# Patient Record
Sex: FEMALE | Race: WHITE
[De-identification: names, ages, dates, MRNs, and addresses within clinical notes are randomized per-mention and may not be internally consistent; named-entity substitution may affect disease eponyms.]

---

## 2018-05-19 ENCOUNTER — HOSPITAL ENCOUNTER (INPATIENT)
Dept: HOSPITAL 54 - ER | Age: 73
LOS: 3 days | Discharge: HOME | DRG: 205 | End: 2018-05-22
Attending: NURSE PRACTITIONER | Admitting: NURSE PRACTITIONER
Payer: MEDICARE

## 2018-05-19 VITALS — WEIGHT: 195 LBS | BODY MASS INDEX: 30.61 KG/M2 | HEIGHT: 67 IN

## 2018-05-19 VITALS — DIASTOLIC BLOOD PRESSURE: 73 MMHG | SYSTOLIC BLOOD PRESSURE: 123 MMHG

## 2018-05-19 DIAGNOSIS — K44.9: ICD-10-CM

## 2018-05-19 DIAGNOSIS — I44.7: ICD-10-CM

## 2018-05-19 DIAGNOSIS — G43.909: ICD-10-CM

## 2018-05-19 DIAGNOSIS — I25.10: ICD-10-CM

## 2018-05-19 DIAGNOSIS — M94.0: Primary | ICD-10-CM

## 2018-05-19 DIAGNOSIS — Z91.013: ICD-10-CM

## 2018-05-19 DIAGNOSIS — D50.9: ICD-10-CM

## 2018-05-19 DIAGNOSIS — M19.90: ICD-10-CM

## 2018-05-19 DIAGNOSIS — K62.1: ICD-10-CM

## 2018-05-19 DIAGNOSIS — G89.29: ICD-10-CM

## 2018-05-19 DIAGNOSIS — E46: ICD-10-CM

## 2018-05-19 DIAGNOSIS — K57.30: ICD-10-CM

## 2018-05-19 DIAGNOSIS — N17.0: ICD-10-CM

## 2018-05-19 DIAGNOSIS — K25.9: ICD-10-CM

## 2018-05-19 DIAGNOSIS — K64.8: ICD-10-CM

## 2018-05-19 DIAGNOSIS — M79.2: ICD-10-CM

## 2018-05-19 DIAGNOSIS — I25.2: ICD-10-CM

## 2018-05-19 DIAGNOSIS — M40.209: ICD-10-CM

## 2018-05-19 DIAGNOSIS — Z79.899: ICD-10-CM

## 2018-05-19 LAB
ALBUMIN SERPL BCP-MCNC: 3.5 G/DL (ref 3.4–5)
ALP SERPL-CCNC: 81 U/L (ref 46–116)
ALT SERPL W P-5'-P-CCNC: 20 U/L (ref 12–78)
APTT PPP: 22 SEC (ref 23–34)
AST SERPL W P-5'-P-CCNC: 16 U/L (ref 15–37)
BASOPHILS # BLD AUTO: 0 /CMM (ref 0–0.2)
BASOPHILS NFR BLD AUTO: 0.4 % (ref 0–2)
BILIRUB DIRECT SERPL-MCNC: 0.1 MG/DL (ref 0–0.2)
BILIRUB SERPL-MCNC: 0.4 MG/DL (ref 0.2–1)
BUN SERPL-MCNC: 26 MG/DL (ref 7–18)
CALCIUM SERPL-MCNC: 8.8 MG/DL (ref 8.5–10.1)
CHLORIDE SERPL-SCNC: 103 MMOL/L (ref 98–107)
CO2 SERPL-SCNC: 25 MMOL/L (ref 21–32)
CREAT SERPL-MCNC: 1.2 MG/DL (ref 0.6–1.3)
EOSINOPHIL NFR BLD AUTO: 0.6 % (ref 0–6)
GLUCOSE SERPL-MCNC: 113 MG/DL (ref 74–106)
HCT VFR BLD AUTO: 26 % (ref 33–45)
HGB BLD-MCNC: 8.1 G/DL (ref 11.5–14.8)
INR PPP: 0.95 (ref 0.87–1.13)
LYMPHOCYTES NFR BLD AUTO: 1.3 /CMM (ref 0.8–4.8)
LYMPHOCYTES NFR BLD AUTO: 13.5 % (ref 20–44)
MCHC RBC AUTO-ENTMCNC: 32 G/DL (ref 31–36)
MCV RBC AUTO: 60 FL (ref 82–100)
MONOCYTES NFR BLD AUTO: 0.7 /CMM (ref 0.1–1.3)
MONOCYTES NFR BLD AUTO: 7.1 % (ref 2–12)
NEUTROPHILS # BLD AUTO: 7.6 /CMM (ref 1.8–8.9)
NEUTROPHILS NFR BLD AUTO: 78.4 % (ref 43–81)
NT-PROBNP SERPL-MCNC: 226 PG/ML (ref 0–125)
PLATELET # BLD AUTO: 419 /CMM (ref 150–450)
POTASSIUM SERPL-SCNC: 4.3 MMOL/L (ref 3.5–5.1)
PROT SERPL-MCNC: 7.7 G/DL (ref 6.4–8.2)
RBC # BLD AUTO: 4.3 MIL/UL (ref 4–5.2)
RDW COEFFICIENT OF VARIATION: 18.4 (ref 11.5–15)
SODIUM SERPL-SCNC: 140 MMOL/L (ref 136–145)
TROPONIN I SERPL-MCNC: < 0.017 NG/ML (ref 0–0.06)
WBC NRBC COR # BLD AUTO: 9.7 K/UL (ref 4.3–11)

## 2018-05-19 PROCEDURE — Z7610: HCPCS

## 2018-05-19 PROCEDURE — A4606 OXYGEN PROBE USED W OXIMETER: HCPCS

## 2018-05-19 PROCEDURE — C9113 INJ PANTOPRAZOLE SODIUM, VIA: HCPCS

## 2018-05-19 NOTE — NUR
SENT FROM URGENT CARE FOR ABNORMAL EKG. WEAKNESS X3 DAYS. VITAL SIGNS WNL 
A/OX4. WILL CONTINUE TO MONITOR FOR ANY CHANGES.

## 2018-05-19 NOTE — NUR
TELE RN ADMISSION NOTES

RECEIVED FROM ER VIA ANTONIO UNDER THE SERVICE OF KULDIP WARD DNP.ALERT,ORIENTED X4,NO 
SOB.CLAIMED WEAKNESS X 3DAYS AND ABNORMAL EKG.SR WITH BBB-81 ON TELE MONITOR.SALINE LOCK 
LEFT AC INTACT AND PATENT.AMBULATE WITH STAND BY ASSIST,WITH STEADY GAIT.C/O PAIN 2/10 ON 
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 
USUALLY CALLED BY  HER CLIENTS "DR FAULKNER '.DONT WANT TO BE CALLED ARUN.CALL LIGHT IN 
REACH,NEEDS ANTICIPATED.

## 2018-05-20 VITALS — DIASTOLIC BLOOD PRESSURE: 64 MMHG | SYSTOLIC BLOOD PRESSURE: 135 MMHG

## 2018-05-20 VITALS — SYSTOLIC BLOOD PRESSURE: 162 MMHG | DIASTOLIC BLOOD PRESSURE: 62 MMHG

## 2018-05-20 VITALS — SYSTOLIC BLOOD PRESSURE: 149 MMHG | DIASTOLIC BLOOD PRESSURE: 58 MMHG

## 2018-05-20 VITALS — DIASTOLIC BLOOD PRESSURE: 62 MMHG | SYSTOLIC BLOOD PRESSURE: 136 MMHG

## 2018-05-20 VITALS — DIASTOLIC BLOOD PRESSURE: 61 MMHG | SYSTOLIC BLOOD PRESSURE: 130 MMHG

## 2018-05-20 VITALS — DIASTOLIC BLOOD PRESSURE: 60 MMHG | SYSTOLIC BLOOD PRESSURE: 139 MMHG

## 2018-05-20 VITALS — DIASTOLIC BLOOD PRESSURE: 53 MMHG | SYSTOLIC BLOOD PRESSURE: 140 MMHG

## 2018-05-20 VITALS — SYSTOLIC BLOOD PRESSURE: 132 MMHG | DIASTOLIC BLOOD PRESSURE: 62 MMHG

## 2018-05-20 VITALS — SYSTOLIC BLOOD PRESSURE: 133 MMHG | DIASTOLIC BLOOD PRESSURE: 57 MMHG

## 2018-05-20 VITALS — SYSTOLIC BLOOD PRESSURE: 134 MMHG | DIASTOLIC BLOOD PRESSURE: 58 MMHG

## 2018-05-20 VITALS — DIASTOLIC BLOOD PRESSURE: 66 MMHG | SYSTOLIC BLOOD PRESSURE: 150 MMHG

## 2018-05-20 VITALS — DIASTOLIC BLOOD PRESSURE: 62 MMHG | SYSTOLIC BLOOD PRESSURE: 132 MMHG

## 2018-05-20 VITALS — DIASTOLIC BLOOD PRESSURE: 75 MMHG | SYSTOLIC BLOOD PRESSURE: 137 MMHG

## 2018-05-20 LAB
ALBUMIN SERPL BCP-MCNC: 3 G/DL (ref 3.4–5)
ALP SERPL-CCNC: 67 U/L (ref 46–116)
ALT SERPL W P-5'-P-CCNC: 17 U/L (ref 12–78)
AST SERPL W P-5'-P-CCNC: 17 U/L (ref 15–37)
BILIRUB SERPL-MCNC: 0.4 MG/DL (ref 0.2–1)
BUN SERPL-MCNC: 20 MG/DL (ref 7–18)
CALCIUM SERPL-MCNC: 8.3 MG/DL (ref 8.5–10.1)
CHLORIDE SERPL-SCNC: 107 MMOL/L (ref 98–107)
CHOLEST SERPL-MCNC: 155 MG/DL (ref ?–200)
CO2 SERPL-SCNC: 24 MMOL/L (ref 21–32)
CREAT SERPL-MCNC: 0.7 MG/DL (ref 0.6–1.3)
EOSINOPHIL NFR BLD MANUAL: 2 % (ref 0–4)
GLUCOSE SERPL-MCNC: 90 MG/DL (ref 74–106)
HCT VFR BLD AUTO: 23 % (ref 33–45)
HDLC SERPL-MCNC: 60 MG/DL (ref 40–60)
HEMOCCULT STL QL: NEGATIVE
HGB BLD-MCNC: 6.9 G/DL (ref 11.5–14.8)
IRON SERPL-MCNC: 10 UG/DL (ref 50–175)
LDLC SERPL DIRECT ASSAY-MCNC: 90 MG/DL (ref 0–99)
LYMPHOCYTES NFR BLD MANUAL: 34 % (ref 16–48)
MAGNESIUM SERPL-MCNC: 2.2 MG/DL (ref 1.8–2.4)
MCHC RBC AUTO-ENTMCNC: 30 G/DL (ref 31–36)
MCV RBC AUTO: 61 FL (ref 82–100)
MONOCYTES NFR BLD MANUAL: 8 % (ref 0–11)
NEUTS SEG NFR BLD MANUAL: 56 % (ref 42–76)
PHOSPHATE SERPL-MCNC: 3.7 MG/DL (ref 2.5–4.9)
PLATELET # BLD AUTO: 323 /CMM (ref 150–450)
POTASSIUM SERPL-SCNC: 4 MMOL/L (ref 3.5–5.1)
PROT SERPL-MCNC: 6.5 G/DL (ref 6.4–8.2)
RBC # BLD AUTO: 3.83 MIL/UL (ref 4–5.2)
RDW COEFFICIENT OF VARIATION: 19.9 (ref 11.5–15)
SODIUM SERPL-SCNC: 140 MMOL/L (ref 136–145)
TIBC SERPL-MCNC: 423 UG/DL (ref 250–450)
TRIGL SERPL-MCNC: 52 MG/DL (ref 30–150)
TSH SERPL DL<=0.005 MIU/L-ACNC: 1.33 UIU/ML (ref 0.36–3.74)
WBC NRBC COR # BLD AUTO: 6.7 K/UL (ref 4.3–11)

## 2018-05-20 PROCEDURE — 30233N1 TRANSFUSION OF NONAUTOLOGOUS RED BLOOD CELLS INTO PERIPHERAL VEIN, PERCUTANEOUS APPROACH: ICD-10-PCS | Performed by: NURSE PRACTITIONER

## 2018-05-20 RX ADMIN — ZOLPIDEM TARTRATE PRN MG: 5 TABLET, FILM COATED ORAL at 00:15

## 2018-05-20 RX ADMIN — GABAPENTIN SCH MG: 100 CAPSULE ORAL at 17:33

## 2018-05-20 RX ADMIN — SODIUM CHLORIDE SCH MG: 9 INJECTION, SOLUTION INTRAVENOUS at 13:57

## 2018-05-20 RX ADMIN — GABAPENTIN SCH MG: 100 CAPSULE ORAL at 09:54

## 2018-05-20 RX ADMIN — ZOLPIDEM TARTRATE PRN MG: 5 TABLET, FILM COATED ORAL at 22:54

## 2018-05-20 RX ADMIN — GABAPENTIN SCH MG: 100 CAPSULE ORAL at 12:51

## 2018-05-20 RX ADMIN — SODIUM CHLORIDE SCH MLS/HR: 9 INJECTION, SOLUTION INTRAVENOUS at 17:32

## 2018-05-20 NOTE — NUR
MS RN NOTES:



PT'S BOWEL MOVEMENTS ARE CLEAR, WATERY, YELLOW. NO MORE FORMED, SOFT STOOLS SHOWING. WILL 
CONTINUE TO MONITOR AS SHE GOES.

## 2018-05-20 NOTE — NUR
TELE RN NOTES

STARTED BLOOD TRANSFUSION, PATIENT WITH STABLE VITAL SIGNS. NO ACUTE DISTRESS NOTED. WILL 
CONTINUE TO MONITOR ACCORDINGLY.

## 2018-05-20 NOTE — NUR
TELE RN NOTES

PATIENT IN BED EYES CLOSED, RESPOND TO VERBAL AND TACTILE STIMULI. NO ACUTE DISTRESS NOTED. 
BREATHING UNLABORED. NO SOB NOTED. DENIED ANY PAIN . IV ACCESS PATENT AND INTACT.SAFETY 
MEASURES IN PLACE. CALL LIGHT WITHIN REACH. WILL CONTINUE TO MONITOR ACCORDINGLY.

## 2018-05-20 NOTE — NUR
MS RN NOTES:



RECEIVED PT AND IS AWAKE. PT IS A/OX4. PT HAS IV ON LAC AND IS PATENT AND INTACT. PT STARTED 
GOLYTELY AND MAG CITRATE. CALL LIGHT WITHIN PT'S REACH. BED KEPT IN LOW, LOCKED POSITION, 
AND SIDE RAILS X 2UP. INFORMED PT THAT POST MIDNIGHT SHE IS NPO FOR PROCEDURE TOMORROW. PT 
AWARE AND UNDERSTOOD. WILL CONTINUE TO MONITOR PT.

## 2018-05-20 NOTE — NUR
TELE RN NOTES  PAIN MANAGEMENT

C/O GENRALIZED PAIN 5.10 ON PAIN SCALE,ULTRAM 25MG PO GIVEN AS ORDERED.

## 2018-05-20 NOTE — NUR
TELE RN NOTES

SR WITH BBB-79,ABLE TO SLEPT WELL WITH AMBIEN AND ULTRAM.IN NO ACUTE DISTRESS.WILL ENDORSE 
TO DAY NURSE FOR LINDA.

## 2018-05-20 NOTE — NUR
MS RN NOTES:



PT REQUESTED FOR SLEEPING AID. WILL CONTINUE TO MONITOR PT. 

-------------------------------------------------------------------------------

Addendum: 05/20/18 at 2256 by COLLIN SMITH RN

-------------------------------------------------------------------------------

PT WAS ADMINISTERED AMBIEN 5MG PO.

## 2018-05-20 NOTE — NUR
TELE RN NOTES

BLOOD TRANSFUSION ON GOING. PATIENT REMAINS WITH STABLE VITAL SIGNS. NO ADVERSE REACTION 
NOTED. NO ACUTE DISTRESS NOTED. WILL CONTINUE TO MONITOR ACCORDINGLY.

## 2018-05-20 NOTE — NUR
TELE RN NOTES

COMPLETED BLOOD TRANFUSION. PATIENT CONTINUED TO REMAIN IN STABLE CONDITION. NO SOB NOTED. 
NO ASE NOTED. PATIENT TOLERATED WELL. WILL CONTINUE TO MONITOR ACCORDINGLY.

## 2018-05-20 NOTE — NUR
TELE RN NOTES

PATIENT IN BED WATCHING TV, ALERT ORIENTED X 4. NO ACUTE DISTRESS NOTED. BREATHING 
UNLABORED. NO SOB NOTED. DENIED ANY PAIN . NO BT ASE NOTED . IV ACCESS PATENT AND INTACT.  
DUE MEDICATIONS GIVEN, NO ASE NOTED. NEEDS ATTENDED AND ANTICIPATED. SAFETY MEASURES IN 
PLACE. CALL LIGHT WITHIN REACH. WILL CONTINUE TO MONITOR ACCORDINGLY. WILL ENDORSE TO NIGHT 
NURSE FOR CONTINUITY OF CARE.

## 2018-05-21 VITALS — DIASTOLIC BLOOD PRESSURE: 70 MMHG | SYSTOLIC BLOOD PRESSURE: 112 MMHG

## 2018-05-21 VITALS — DIASTOLIC BLOOD PRESSURE: 47 MMHG | SYSTOLIC BLOOD PRESSURE: 132 MMHG

## 2018-05-21 VITALS — DIASTOLIC BLOOD PRESSURE: 60 MMHG | SYSTOLIC BLOOD PRESSURE: 143 MMHG

## 2018-05-21 VITALS — SYSTOLIC BLOOD PRESSURE: 128 MMHG | DIASTOLIC BLOOD PRESSURE: 67 MMHG

## 2018-05-21 VITALS — DIASTOLIC BLOOD PRESSURE: 60 MMHG | SYSTOLIC BLOOD PRESSURE: 133 MMHG

## 2018-05-21 VITALS — SYSTOLIC BLOOD PRESSURE: 116 MMHG | DIASTOLIC BLOOD PRESSURE: 57 MMHG

## 2018-05-21 VITALS — SYSTOLIC BLOOD PRESSURE: 122 MMHG | DIASTOLIC BLOOD PRESSURE: 73 MMHG

## 2018-05-21 LAB
ALBUMIN SERPL BCP-MCNC: 3.1 G/DL (ref 3.4–5)
ALP SERPL-CCNC: 69 U/L (ref 46–116)
ALT SERPL W P-5'-P-CCNC: 24 U/L (ref 12–78)
AST SERPL W P-5'-P-CCNC: 18 U/L (ref 15–37)
BILIRUB SERPL-MCNC: 0.7 MG/DL (ref 0.2–1)
BUN SERPL-MCNC: 15 MG/DL (ref 7–18)
CALCIUM SERPL-MCNC: 8.7 MG/DL (ref 8.5–10.1)
CHLORIDE SERPL-SCNC: 108 MMOL/L (ref 98–107)
CO2 SERPL-SCNC: 26 MMOL/L (ref 21–32)
CREAT SERPL-MCNC: 0.7 MG/DL (ref 0.6–1.3)
GLUCOSE SERPL-MCNC: 91 MG/DL (ref 74–106)
HCT VFR BLD AUTO: 27 % (ref 33–45)
HGB BLD-MCNC: 8.3 G/DL (ref 11.5–14.8)
LYMPHOCYTES NFR BLD MANUAL: 14 % (ref 16–48)
MAGNESIUM SERPL-MCNC: 2.3 MG/DL (ref 1.8–2.4)
MCHC RBC AUTO-ENTMCNC: 31 G/DL (ref 31–36)
MCV RBC AUTO: 63 FL (ref 82–100)
MONOCYTES NFR BLD MANUAL: 3 % (ref 0–11)
NEUTS SEG NFR BLD MANUAL: 83 % (ref 42–76)
PHOSPHATE SERPL-MCNC: 3.8 MG/DL (ref 2.5–4.9)
PLATELET # BLD AUTO: 306 /CMM (ref 150–450)
POTASSIUM SERPL-SCNC: 4.1 MMOL/L (ref 3.5–5.1)
PROT SERPL-MCNC: 6.5 G/DL (ref 6.4–8.2)
RBC # BLD AUTO: 4.24 MIL/UL (ref 4–5.2)
RDW COEFFICIENT OF VARIATION: 22.4 (ref 11.5–15)
SODIUM SERPL-SCNC: 141 MMOL/L (ref 136–145)
WBC NRBC COR # BLD AUTO: 7.3 K/UL (ref 4.3–11)

## 2018-05-21 PROCEDURE — 0DB68ZX EXCISION OF STOMACH, VIA NATURAL OR ARTIFICIAL OPENING ENDOSCOPIC, DIAGNOSTIC: ICD-10-PCS

## 2018-05-21 PROCEDURE — 0DBP8ZZ EXCISION OF RECTUM, VIA NATURAL OR ARTIFICIAL OPENING ENDOSCOPIC: ICD-10-PCS

## 2018-05-21 RX ADMIN — GABAPENTIN SCH MG: 100 CAPSULE ORAL at 09:37

## 2018-05-21 RX ADMIN — GABAPENTIN SCH MG: 300 CAPSULE ORAL at 16:40

## 2018-05-21 RX ADMIN — SODIUM CHLORIDE SCH MG: 9 INJECTION, SOLUTION INTRAVENOUS at 14:11

## 2018-05-21 RX ADMIN — SODIUM CHLORIDE SCH MLS/HR: 9 INJECTION, SOLUTION INTRAVENOUS at 14:11

## 2018-05-21 NOTE — NUR
m/s lvn: notes



up and about in room. family visiting. s/p fall. no acute distress noted. needs attended. 
instructed to call for assistance. will continue to monitor.

## 2018-05-21 NOTE — NUR
m/s lvn: notes



dinner served. hob elevated. needs attended. instructed to call for assistance. will 
continue to monitor.

## 2018-05-21 NOTE — NUR
m/s lvn: notes



resting comfortable in bed. no distress noted. instructed to call for assistance. will 
continue to monitor.

## 2018-05-21 NOTE — NUR
m/s lvn: md visit



seen and examined by dr. yeager at this time. daughter in law visiting. md updated plan of 
care. for d'c planning tomorrow.

## 2018-05-21 NOTE — NUR
m/s lvn: notes



pt tolerated lunch. no c/o n/v/d at this time. instructed to call for assistance. will 
continue to monitor.

## 2018-05-21 NOTE — NUR
MS RN NOTE:



PATIENT RESTING IN BED, NO ACUTE DISTRESS NOTED, FAMILY AT BEDSIDE. BREATHING EVEN AND 
UNLABORED, NO SOB NOTED. IV TO LAC IN PLACE. BED LOCKED AND IN LOWEST POSITION, CALL LIGHT 
IN REACH. WILL CONTINUE TO MONITOR.

## 2018-05-21 NOTE — NUR
m/s lvn: notes



medicated with neurontin 100mg po as ordered. instructed to call for assistance. breakfast 
ordered. call light within reach. will continue to monitor.

## 2018-05-21 NOTE — NUR
m/s lvn: notes



received pt from recovery room with dx: s/p egd and colonoscopy. orders received and carried 
out and noted. vss. pt awake, a/ox4. c/o itchiness to right arm, offered benadryl, but pt 
refused, stated, "i need my neurontin, i need a double dose." informed her that she has 
100mg po tid ordered and md will have to review her meds. reyna (np student of dr. yeager) here and made aware. instructed to call for assistance. will continue to monitor.

## 2018-05-21 NOTE — NUR
m/s lvn: notes



terra (rn) heard a loud noise. upon arrival, pt was found on a sitting position next to 
her bed and drawer. rom performed and wnl. denies hitting head. noted with a minor 
scrape/scratch on her mid/upper back. denies any pain, but still c/o of itchiness. still 
refuses benadryl when offered. no rash noted. asked pt what happened, stated, "i got up to 
change my clothes because i was itching, i was trying to put my pants and i slipped." pt 
didn't not call for help because it's hard to give up her independence as stated. assisted 
back to chair and back to bed. reyna (np student) at bedside and aware. cn aware. vss. 
instructed to call for assistance. will continue to monitor.

## 2018-05-21 NOTE — NUR
MS RN CLOSING NOTES:



ALL NEEDS WERE ATTENDED AND ANTICIPATED FOR. ON ROOM AIR AND TOLERATING WELL. PT HAS BEEN 
NPO SINCE MIDNIGHT. PT HAS IV ON L AC #20G AND IS PATENT AND INTACT. CURRENTLY S/L. CALL 
LIGHT WITHIN PT'S REACH. BED KEPT IN LOW, LOCKED POSITION, AND SIDE RAILS X 2UP. WILL 
ENDORSE TO AM NURSE FOR LINDA. 

-------------------------------------------------------------------------------

Addendum: 05/21/18 at 0716 by COLLIN SMITH RN

-------------------------------------------------------------------------------

PT HAS BEEN CLEAR FOR THE PROCEDURE. NO PARTICLES IN HER BMS.

## 2018-05-22 VITALS — DIASTOLIC BLOOD PRESSURE: 68 MMHG | SYSTOLIC BLOOD PRESSURE: 128 MMHG

## 2018-05-22 LAB
ALBUMIN SERPL ELPH-MCNC: 3.3 G/DL (ref 2.9–4.4)
ALBUMIN/GLOB SERPL: 1.2 {RATIO} (ref 0.7–1.7)
ALPHA1 GLOB SERPL ELPH-MCNC: 0.2 G/DL (ref 0–0.4)
ALPHA2 GLOB SERPL ELPH-MCNC: 0.6 G/DL (ref 0.4–1)
B-GLOBULIN SERPL ELPH-MCNC: 1.1 G/DL (ref 0.7–1.3)
BASOPHILS # BLD AUTO: 0 /CMM (ref 0–0.2)
BASOPHILS NFR BLD AUTO: 0.3 % (ref 0–2)
BUN SERPL-MCNC: 12 MG/DL (ref 7–18)
CALCIUM SERPL-MCNC: 8.4 MG/DL (ref 8.5–10.1)
CHLORIDE SERPL-SCNC: 108 MMOL/L (ref 98–107)
CO2 SERPL-SCNC: 27 MMOL/L (ref 21–32)
CREAT SERPL-MCNC: 0.7 MG/DL (ref 0.6–1.3)
EOSINOPHIL NFR BLD AUTO: 2.9 % (ref 0–6)
EOSINOPHIL NFR BLD MANUAL: 3 % (ref 0–4)
GAMMA GLOB SERPL ELPH-MCNC: 0.8 G/DL (ref 0.4–1.8)
GLOBULIN SER CALC-MCNC: 2.8 G/DL (ref 2.2–3.9)
GLUCOSE SERPL-MCNC: 91 MG/DL (ref 74–106)
HCT VFR BLD AUTO: 26 % (ref 33–45)
HGB BLD-MCNC: 7.9 G/DL (ref 11.5–14.8)
IGA SERPL-MCNC: 343 MG/DL (ref 64–422)
IGM SERPL-MCNC: 112 MG/DL (ref 26–217)
LYMPHOCYTES NFR BLD AUTO: 1.9 /CMM (ref 0.8–4.8)
LYMPHOCYTES NFR BLD AUTO: 28 % (ref 20–44)
LYMPHOCYTES NFR BLD MANUAL: 24 % (ref 16–48)
MAGNESIUM SERPL-MCNC: 2.3 MG/DL (ref 1.8–2.4)
MCHC RBC AUTO-ENTMCNC: 30 G/DL (ref 31–36)
MCV RBC AUTO: 64 FL (ref 82–100)
MONOCYTES NFR BLD AUTO: 0.7 /CMM (ref 0.1–1.3)
MONOCYTES NFR BLD AUTO: 9.9 % (ref 2–12)
MONOCYTES NFR BLD MANUAL: 9 % (ref 0–11)
NEUTROPHILS # BLD AUTO: 4.1 /CMM (ref 1.8–8.9)
NEUTROPHILS NFR BLD AUTO: 58.9 % (ref 43–81)
NEUTS SEG NFR BLD MANUAL: 64 % (ref 42–76)
PHOSPHATE SERPL-MCNC: 3.7 MG/DL (ref 2.5–4.9)
PLATELET # BLD AUTO: 309 /CMM (ref 150–450)
POTASSIUM SERPL-SCNC: 4 MMOL/L (ref 3.5–5.1)
RBC # BLD AUTO: 4.1 MIL/UL (ref 4–5.2)
RDW COEFFICIENT OF VARIATION: 23.2 (ref 11.5–15)
SODIUM SERPL-SCNC: 142 MMOL/L (ref 136–145)
WBC NRBC COR # BLD AUTO: 7 K/UL (ref 4.3–11)

## 2018-05-22 RX ADMIN — SODIUM CHLORIDE SCH MLS/HR: 9 INJECTION, SOLUTION INTRAVENOUS at 14:00

## 2018-05-22 RX ADMIN — SODIUM CHLORIDE SCH MG: 9 INJECTION, SOLUTION INTRAVENOUS at 13:00

## 2018-05-22 RX ADMIN — GABAPENTIN SCH MG: 300 CAPSULE ORAL at 08:40

## 2018-05-22 NOTE — NUR
m/s lvn: notes



received order from sheyla (np) to discharge pt home with prescription. order acknowledged. 
h/l removed with tip intact.

## 2018-05-22 NOTE — NUR
MS RN NOTE:



PATIENT SLEEPING IN BED, NO ACUTE DISTRESS NOTED. BED LOCKED AND IN LOWEST POSITION, CALL 
LIGHT IN REACH, WILL CONTINUE TO MONITOR.

## 2018-05-22 NOTE — NUR
m/s lvn: gi f/u



seen and examined by oscar (np) at this time and spoke to pt and son. pt is stable for 
discharge today per np. sheyla (nora) notified and made aware.

## 2018-05-22 NOTE — NUR
m/s lvn: md visit



pt wants to go today. sheyla (nora) made aware. seen and examined by sheyla (nora) at this time. 
no new orders at this time.

## 2018-05-22 NOTE — NUR
m/s lvn: discharged



discharged home in stable condition with all valuables and d'c papers via own private car.

## 2018-05-22 NOTE — NUR
MS RN NOTE:



PATIENT RESTING IN BED, NO ACUTE DISTRESS NOTED. BREATHING EVEN AND UNLABORED, NO SOB NOTED. 
IV TO LAC IN PLACE. BED LOCKED AND IN LOWEST POSITION, CALL LIGHT IN REACH. WILL ENDORSE TO 
DAY NURSE TO CONTINUE WITH PLAN OF CARE.

## 2018-05-22 NOTE — NUR
m/s lvn: d'c instructions



discharged instructions with prescription given to pt and son. pt and son verbalized 
understanding and will Follow up with PCP within 1 week, Follow up with Dr. Thad Dobson 
in [place], Follow up with GI within 1 week, Follow up with hematology/oncology within 
1 week Continue home medications, Continue iron supplement, Given prescription for Carafate; 
take as directed, Encourage increase of high fiber nutritional intake and hydration, and 
Avoid NSAIDs.

## 2018-05-22 NOTE — NUR
m/s lvn: notes



son here and spoke to sheyla (np) and updated plan of care. awaiting for gi and then pt will 
be discharge home today.

## 2018-05-22 NOTE — NUR
m/s lvn: initial assessment



received pt in bed awake, a/ox4. ambulatory. no c/o abdominal pain, nausea, vomiting, or any 
discomfort. instructed to call for assistance. will continue to monitor.

## 2018-05-23 LAB
*HGBFRC HEMOGLOBIN C: 0 %
*HGBFRCHEMOGLOBIN VARIANT: 0 %
HGB A MFR BLD ELPH: 98.3 % (ref 96.4–98.8)
HGB A2 MFR BLD COLUMN CHROM: 1.7 % (ref 1.8–3.2)
HGB F MFR BLD: 0 % (ref 0–2)
HGB S MFR BLD: 0 %